# Patient Record
(demographics unavailable — no encounter records)

---

## 2017-09-02 NOTE — NUR
Patient discharged with v/s stable. Written and verbal after care instructions 
given and explained. 

Patient alert, oriented and verbalized understanding of instructions. 
Ambulatory with steady gait. All questions addressed prior to discharge. ID 
band removed. Patient advised to follow up with PMD. Rx of Albuterol inhaler 
given. Patient educated on indication of medication including possible reaction 
and side effects. Opportunity to ask questions provided and answered.